# Patient Record
Sex: FEMALE | NOT HISPANIC OR LATINO
[De-identification: names, ages, dates, MRNs, and addresses within clinical notes are randomized per-mention and may not be internally consistent; named-entity substitution may affect disease eponyms.]

---
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
## 2024-04-25 ENCOUNTER — OFFICE VISIT (OUTPATIENT)
Dept: FAMILY MEDICINE CLINIC | Facility: CLINIC | Age: 19
End: 2024-04-25
Payer: COMMERCIAL

## 2024-04-25 VITALS
TEMPERATURE: 98 F | RESPIRATION RATE: 18 BRPM | WEIGHT: 126 LBS | HEART RATE: 78 BPM | OXYGEN SATURATION: 100 % | DIASTOLIC BLOOD PRESSURE: 64 MMHG | HEIGHT: 66.93 IN | SYSTOLIC BLOOD PRESSURE: 104 MMHG | BODY MASS INDEX: 19.78 KG/M2

## 2024-04-25 DIAGNOSIS — K13.70 ORAL LESION: Primary | ICD-10-CM

## 2024-04-25 DIAGNOSIS — J02.9 SORE THROAT: ICD-10-CM

## 2024-04-25 LAB
CONTROL LINE PRESENT WITH A CLEAR BACKGROUND (YES/NO): YES YES/NO
KIT LOT #: NORMAL NUMERIC
STREP GRP A CUL-SCR: NEGATIVE

## 2024-04-25 PROCEDURE — 99212 OFFICE O/P EST SF 10 MIN: CPT | Performed by: PHYSICIAN ASSISTANT

## 2024-04-25 PROCEDURE — 3078F DIAST BP <80 MM HG: CPT | Performed by: PHYSICIAN ASSISTANT

## 2024-04-25 PROCEDURE — 87880 STREP A ASSAY W/OPTIC: CPT | Performed by: PHYSICIAN ASSISTANT

## 2024-04-25 PROCEDURE — 3008F BODY MASS INDEX DOCD: CPT | Performed by: PHYSICIAN ASSISTANT

## 2024-04-25 PROCEDURE — 3074F SYST BP LT 130 MM HG: CPT | Performed by: PHYSICIAN ASSISTANT

## 2024-04-26 NOTE — PROGRESS NOTES
CHIEF COMPLAINT:     Chief Complaint   Patient presents with    Other     2 days ago bump appeared on right cheek near the back, sore throat         HPI:   Basia Patino is a 18 year old female who presents with mostly right sided sore throat and noted a painful area in the right cheek area.  Denies any trauma. She can feel a bump with her tongue and it is painful.       Current Outpatient Medications   Medication Sig Dispense Refill    norethindrone-ethinyl estradiol 0.5-35 MG-MCG Oral Tab Take 1 tablet by mouth daily.        History reviewed. No pertinent past medical history.   Social History:  Social History     Socioeconomic History    Marital status: Single   Tobacco Use    Smoking status: Never     Passive exposure: Never    Smokeless tobacco: Never   Vaping Use    Vaping status: Every Day   Substance and Sexual Activity    Alcohol use: No    Drug use: No     Social Determinants of Health     Food Insecurity: No Food Insecurity (12/8/2023)    Received from [facility]    Food Insecurity     Currently or in the past 3 months, have you worried your food would run out before you had money to buy more?: No     In the past 12 months, have you run out of food or been unable to get more?: No   Transportation Needs: No Transportation Needs (12/8/2023)    Received from [facility]    Transportation Needs     Medical Transportation Needs?: No    Received from [facility], [facility]    Social Connections    Received from [facility], [facility]    Housing Stability        REVIEW OF SYSTEMS:   GENERAL: Denies fever, chills,weight change, decreased appetite  SKIN: Denies rashes, skin wounds or ulcers.  EYES: Denies blurred vision or double vision  HENT: Denies congestion, rhinorrhea, sore throat or ear pain  CHEST: Denies chest pain, or palpitations  LUNGS: Denies shortness of breath, cough, or  wheezing  GI: Denies abdominal pain, N/V/C/D.   MUSCULOSKELETAL: no arthralgia or swollen joints  LYMPH:  Denies lymphadenopathy  NEURO: Denies headaches or lightheadedness    Positive for stated complaint: sore throat and oral lesion.   Pertinent positives and negatives noted in the the HPI.    EXAM:   /64   Pulse 78   Temp 97.7 °F (36.5 °C)   Resp 18   Ht 5' 6.93\" (1.7 m)   Wt 126 lb (57.2 kg)   LMP 04/04/2024 (Approximate)   SpO2 100%   BMI 19.78 kg/m²   GENERAL: well developed, well nourished,in no apparent distress.  Clear voice  SKIN: no rashes,no suspicious lesions  HEAD: atraumatic, normocephalic  EYES: conjunctiva clear, sclera white,  PERRLA  EARS: TM's non erythematous  NOSE: nares patent, mucosa without congestion  THROAT: Posterior pharynx is mildy erythematous, no PND, no exudates, tonsils 1 + without exudate.  She has a 5-6 mm fleshy papule on the right posterior buccal mucosa.  It is is mildy erythematous, no ulceration or drainage.   NECK: supple, non-tender  LUNGS: clear to auscultation bilaterally without rale, ronchi, wheeze.  CARDIO: S1/S2 without murmur  GI: BS's present x4. No palpable masses or organomegaly.  no tenderness on palpation  EXTREMITIES: no cyanosis, clubbing or edema  LYMPH:  no  lymphadenopathy.      Recent Results (from the past 24 hour(s))   Strep A Assay W/Optic    Collection Time: 04/25/24  7:41 PM   Result Value Ref Range    Strep Grp A Screen negative Negative    Control Line Present with a clear background (yes/no) yes Yes/No    Kit Lot # [id number] Numeric    Kit Expiration Date 3/1/25 Date         ASSESSMENT AND PLAN:   Basia Patino is a 18 year old female who presents with Other (2 days ago bump appeared on right cheek near the back, sore throat/). Symptoms are consistent with:      ASSESSMENT:  Encounter Diagnoses   Name Primary?    Sore throat     Oral lesion Yes         PLAN:  RSS is negative.    The lesion has a benign  appearance and for now  advise chloraseptic spray, tylenol, gargles.  If persistent over the next week or two follow up with ENT or oral surgery for evaluation.      Meds as below.  Comfort care instructions as listed in Patient Instructions    Meds & Refills for this Visit:  Requested Prescriptions      No prescriptions requested or ordered in this encounter       Risks, benefits, side effects of medication addressed and explained.    There are no Patient Instructions on file for this visit.    The patient indicates understanding of these issues and agrees to the plan.  The patient is asked to follow up PCP if sx's persist or worsen.

## 2024-05-09 ENCOUNTER — HOSPITAL ENCOUNTER (OUTPATIENT)
Age: 19
Discharge: HOME | End: 2024-05-09
Payer: COMMERCIAL

## 2024-05-09 VITALS
HEIGHT: 66 IN | TEMPERATURE: 98 F | OXYGEN SATURATION: 99 % | WEIGHT: 120 LBS | BODY MASS INDEX: 19.29 KG/M2 | SYSTOLIC BLOOD PRESSURE: 102 MMHG | DIASTOLIC BLOOD PRESSURE: 65 MMHG | RESPIRATION RATE: 16 BRPM | HEART RATE: 105 BPM

## 2024-05-09 DIAGNOSIS — J06.9 VIRAL URI: Primary | ICD-10-CM

## 2024-05-09 DIAGNOSIS — J02.9 ACUTE VIRAL PHARYNGITIS: ICD-10-CM

## 2024-05-09 LAB
S PYO AG THROAT QL: NEGATIVE
SARS-COV-2 RNA RESP QL NAA+PROBE: NOT DETECTED

## 2024-05-09 PROCEDURE — U0002 COVID-19 LAB TEST NON-CDC: HCPCS | Performed by: NURSE PRACTITIONER

## 2024-05-09 PROCEDURE — 87880 STREP A ASSAY W/OPTIC: CPT | Performed by: NURSE PRACTITIONER

## 2024-05-09 PROCEDURE — 99213 OFFICE O/P EST LOW 20 MIN: CPT | Performed by: NURSE PRACTITIONER

## 2024-05-09 NOTE — ED PROVIDER NOTES
Patient Seen in: [facility]      History     Chief Complaint   Patient presents with    Sore Throat    Dizziness    Stuffy Nose     Stated Complaint: sore throat, dizzy    Subjective:   18-year-old female presents today with complaints of mild URI symptoms with worsening sore throat and feelings of dizziness.  Symptoms over the last 1-2 days.  Denies recent exposure to illness.  No difficulty swallowing controlling screws.  No stridor shortness of breath.  Patient is afebrile on arrival.  No other symptoms or concerns.  The patient's medication list, past medical history and social history elements as listed in today's nurse's notes were reviewed and agreed (except as otherwise stated in the HPI).  The patient's family history reviewed and determined to be noncontributory to the presenting problem            Objective:   History reviewed. No pertinent past medical history.           History reviewed. No pertinent surgical history.             Social History     Socioeconomic History    Marital status: Single   Tobacco Use    Smoking status: Never     Passive exposure: Never    Smokeless tobacco: Never   Vaping Use    Vaping status: Every Day   Substance and Sexual Activity    Alcohol use: No    Drug use: No     Social Determinants of Health     Food Insecurity: No Food Insecurity (12/8/2023)    Received from [facility]    Food Insecurity     Currently or in the past 3 months, have you worried your food would run out before you had money to buy more?: No     In the past 12 months, have you run out of food or been unable to get more?: No   Transportation Needs: No Transportation Needs (12/8/2023)    Received from [facility]    Transportation Needs     Medical Transportation Needs?: No    Received from [facility], [facility]    Social Connections    Received from [facility], [facility]     Housing Stability              Review of Systems    Positive for stated complaint: sore throat, dizzy  Other systems are as noted in HPI.  Constitutional and vital signs reviewed.      All other systems reviewed and negative except as noted above.    Physical Exam     ED Triage Vitals [05/09/24 1427]   /65   Pulse 105   Resp 16   Temp 98.1 °F (36.7 °C)   Temp src Temporal   SpO2 99 %   O2 Device None (Room air)       Current Vitals:   Vital Signs  BP: 102/65  Pulse: 105  Resp: 16  Temp: 98.1 °F (36.7 °C)  Temp src: Temporal    Oxygen Therapy  SpO2: 99 %  O2 Device: None (Room air)            Physical Exam  Vitals and nursing note reviewed.   Constitutional:       Appearance: She is well-developed.   HENT:      Head: Normocephalic.      Right Ear: Tympanic membrane and ear canal normal.      Left Ear: Tympanic membrane and ear canal normal.      Nose: Mucosal edema and congestion present.      Mouth/Throat:      Pharynx: Uvula midline. Pharyngeal swelling and posterior oropharyngeal erythema present.   Eyes:      Conjunctiva/sclera: Conjunctivae normal.      Pupils: Pupils are equal, round, and reactive to light.   Cardiovascular:      Rate and Rhythm: Normal rate and regular rhythm.   Pulmonary:      Effort: Pulmonary effort is normal.      Breath sounds: Normal breath sounds.   Musculoskeletal:      Cervical back: Normal range of motion and neck supple.   Lymphadenopathy:      Cervical: No cervical adenopathy.   Skin:     General: Skin is warm and dry.   Neurological:      Mental Status: She is alert and oriented to person, place, and time.               ED Course     Labs Reviewed   POCT RAPID STREP - Normal   RAPID SARS-COV-2 BY PCR - Normal                      MDM     Please note that this report has been produced using speech recognition software and may contain errors related to that system including, but not limited to, errors in grammar, punctuation, and spelling, as well as words and phrases that  possibly may have been recognized inappropriately.  If there are any questions or concerns, contact the dictating provider for clarification.        Note to patient: The 21st Century Cures Act makes medical notes like these available to patients in the interest of transparency. However, this is a medical document intended as peer to peer communication. It is written in medical language and may contain abbreviations or verbiage that are unfamiliar. It may appear blunt or direct. Medical documents are intended to carry relevant information, facts as evident, and the clinical opinion of the practitioner.                                   Medical Decision Making  Differential diagnosis includes but is not limited to: COVID-19, viral URI, strep throat, influenza, pneumonia, sinusitis, bronchitis      Presented today with URI symptoms with worsening sore throat.  Symptoms started yesterday.  Worsening of sore throat today.  COVID-19 testing was done and negative.  Rapid strep also done and negative.  Suspect viral cause of illness.  Encouraged to Push fluids and rest, alternate Tylenol and Motrin for any fever pain.  Take over-the-counter antihistamine and cough suppressant as needed.  Use a cool-mist humidifier at the bedside.  To follow primary care physician in 1 week if symptoms do not improve.  Take over-the-counter zinc and vitamin C to boost your immune system.  Patient verbalized understanding agree with plan of care.        Amount and/or Complexity of Data Reviewed  Labs: ordered. Decision-making details documented in ED Course.     Details: Rapid strep  Rapid COVID-19    Risk  OTC drugs.        Disposition and Plan     Clinical Impression:  1. Viral URI    2. Acute viral pharyngitis         Disposition:  Discharge  5/9/2024  3:11 pm    Follow-up:  Kenny He MD  [address] [place] [address]  [address] [place] [id number]  [phone]    In 1 week            Medications Prescribed:  Current Discharge Medication List

## 2024-05-09 NOTE — DISCHARGE INSTRUCTIONS
Take over-the-counter antihistamine such as Zyrtec Allegra Claritin or Xyzal.  May also use Flonase nasal spray.  This will help with inflammation of the sinus throat and ears.  Alternate Tylenol and Motrin for any fever pain.  Be sure to push fluids and rest.  Follow-up with your primary care physician in 1 week if symptoms do not improve.

## 2024-11-14 ENCOUNTER — HOSPITAL ENCOUNTER (OUTPATIENT)
Age: 19
Discharge: HOME | End: 2024-11-14
Payer: COMMERCIAL

## 2024-11-14 VITALS
HEART RATE: 117 BPM | DIASTOLIC BLOOD PRESSURE: 84 MMHG | OXYGEN SATURATION: 99 % | TEMPERATURE: 98 F | RESPIRATION RATE: 18 BRPM | SYSTOLIC BLOOD PRESSURE: 117 MMHG

## 2024-11-14 DIAGNOSIS — Z20.2 EXPOSURE TO CHLAMYDIA: Primary | ICD-10-CM

## 2024-11-14 DIAGNOSIS — R30.0 DYSURIA: ICD-10-CM

## 2024-11-14 LAB
B-HCG UR QL: NEGATIVE
BILIRUB UR QL STRIP: NEGATIVE
CLARITY UR: CLEAR
COLOR UR: YELLOW
GLUCOSE UR STRIP-MCNC: NEGATIVE MG/DL
HGB UR QL STRIP: NEGATIVE
KETONES UR STRIP-MCNC: NEGATIVE MG/DL
NITRITE UR QL STRIP: NEGATIVE
PH UR STRIP: 7 [PH]
PROT UR STRIP-MCNC: 30 MG/DL
SP GR UR STRIP: 1.02
UROBILINOGEN UR STRIP-ACNC: <2 MG/DL

## 2024-11-14 PROCEDURE — 87086 URINE CULTURE/COLONY COUNT: CPT | Performed by: PHYSICIAN ASSISTANT

## 2024-11-14 PROCEDURE — 87591 N.GONORRHOEAE DNA AMP PROB: CPT | Performed by: PHYSICIAN ASSISTANT

## 2024-11-14 PROCEDURE — 99214 OFFICE O/P EST MOD 30 MIN: CPT | Performed by: PHYSICIAN ASSISTANT

## 2024-11-14 PROCEDURE — 81025 URINE PREGNANCY TEST: CPT | Performed by: PHYSICIAN ASSISTANT

## 2024-11-14 PROCEDURE — 96372 THER/PROPH/DIAG INJ SC/IM: CPT | Performed by: PHYSICIAN ASSISTANT

## 2024-11-14 PROCEDURE — 81514 NFCT DS BV&VAGINITIS DNA ALG: CPT | Performed by: PHYSICIAN ASSISTANT

## 2024-11-14 PROCEDURE — 87491 CHLMYD TRACH DNA AMP PROBE: CPT | Performed by: PHYSICIAN ASSISTANT

## 2024-11-14 PROCEDURE — 81002 URINALYSIS NONAUTO W/O SCOPE: CPT | Performed by: PHYSICIAN ASSISTANT

## 2024-11-14 RX ORDER — DOXYCYCLINE 100 MG/1
100 CAPSULE ORAL 2 TIMES DAILY
Qty: 14 CAPSULE | Refills: 0 | Status: SHIPPED | OUTPATIENT
Start: 2024-11-14 | End: 2024-11-21

## 2024-11-14 NOTE — ED PROVIDER NOTES
Patient Seen in: [facility]      History     Chief Complaint   Patient presents with    Eval-G     Stated Complaint: STD check    Subjective:   HPI      Patient states she found out a week ago that her boyfriend tested positive for chlamydia.  Yesterday she started having dysuria and some watery vaginal discharge.  Denies abdominal/pelvic pain.  Denies fevers, chills, vomiting, diarrhea.  Denies prior STIs.  Denies any other complaints/concerns at this time.     Objective:     History reviewed. No pertinent past medical history.           History reviewed. No pertinent surgical history.             No pertinent social history.            Review of Systems    Positive for stated complaint: STD check  Other systems are as noted in HPI.  Constitutional and vital signs reviewed.      All other systems reviewed and negative except as noted above.    Physical Exam     ED Triage Vitals [11/14/24 1354]   /84   Pulse 117   Resp 18   Temp 98 °F (36.7 °C)   Temp src Temporal   SpO2 99 %   O2 Device None (Room air)       Current Vitals:   Vital Signs  BP: 117/84  Pulse: 117  Resp: 18  Temp: 98 °F (36.7 °C)  Temp src: Temporal    Oxygen Therapy  SpO2: 99 %  O2 Device: None (Room air)        Physical Exam  Vitals and nursing note reviewed. Exam conducted with a chaperone present (Patricia technician).   Constitutional:       Appearance: Normal appearance.   Eyes:      Conjunctiva/sclera: Conjunctivae normal.   Pulmonary:      Effort: Pulmonary effort is normal.   Genitourinary:     Vagina: No foreign body. Vaginal discharge (yellowish) present. No erythema or bleeding.      Uterus: Normal.       Adnexa: Right adnexa normal and left adnexa normal.      Comments: No CMT  Musculoskeletal:         General: Normal range of motion.   Skin:     General: Skin is warm and dry.   Neurological:      General: No focal deficit present.      Mental Status: She is alert.   Psychiatric:         Mood and Affect: Mood normal.              ED Course     Labs Reviewed   [facility] POCT URINALYSIS DIPSTICK - Abnormal; Notable for the following components:       Result Value    Protein urine 30 (*)     Leukocyte esterase urine Small (*)     All other components within normal limits   POCT PREGNANCY URINE - Normal   CHLAMYDIA/GONOCOCCUS, ABDIRAHMAN   URINE CULTURE, ROUTINE   VAGINITIS VAGINOSIS PCR PANEL                   MDM      Differential diagnosis includes but is not limited to STI exposure, chlamydia, gonorrhea, PID, UTI.    Patient well-appearing, non-toxic.  Given her exposure plan to treat with antibiotics at this time, cultures pending.  I advised no intercourse until completion of antibiotics.  I advised followup with her PCP/Gyne for further outpatient testing, including HIV and syphilis.  I advised supportive care at home, follow up and provided return precautions.  Patient verbalized understanding/agreement of plan.         Medical Decision Making      Disposition and Plan     Clinical Impression:  1. Exposure to chlamydia    2. Dysuria         Disposition:  Discharge  11/14/2024  2:37 pm    Follow-up:  Kenny He MD  [address] [place] [address]  [address] [place] [address]  [phone]    In 3 days            Medications Prescribed:  Current Discharge Medication List        START taking these medications    Details   doxycycline 100 MG Oral Cap Take 1 capsule (100 mg total) by mouth 2 (two) times daily for 7 days.  Qty: 14 capsule, Refills: 0                 Supplementary Documentation:

## 2024-11-14 NOTE — ED INITIAL ASSESSMENT (HPI)
Pt sts boyfriend dx'd  with chlamydia last week. Pt sts yesterday began with vaginal burning, small amt of blood on tissue when wiping, non odorous yellow, thin discharge.

## 2024-11-14 NOTE — DISCHARGE INSTRUCTIONS
Take antibiotics as instructed.  We will notify you of pending test results.  Follow up with your PCP for further outpatient STD testing, including HIV and syphilis.  Go to ER for new/worsening symptoms (ie abdominal pain, fevers, vomiting, etc)    While taking antibiotics it is recommended that you also take an over the counter probiotics.  If you'd like, you can have 2 servings of yogurt/Kefir daily instead.  Probiotics increase the good bacteria in your gut while the antibiotic fights the bad bacteria that is causing your infection.  The good bacteria in your gut act as part of your immune system.  Taking a probiotic while on antibiotics will decrease the chances of upset stomach and diarrhea, which are common side effects of antibiotics.

## 2024-11-15 LAB
BV BACTERIA DNA VAG QL NAA+PROBE: NEGATIVE
C GLABRATA DNA VAG QL NAA+PROBE: NEGATIVE
C KRUSEI DNA VAG QL NAA+PROBE: NEGATIVE
C TRACH DNA SPEC QL NAA+PROBE: POSITIVE
CANDIDA DNA VAG QL NAA+PROBE: NEGATIVE
N GONORRHOEA DNA SPEC QL NAA+PROBE: NEGATIVE
T VAGINALIS DNA VAG QL NAA+PROBE: NEGATIVE

## 2024-11-17 ENCOUNTER — HOSPITAL ENCOUNTER (OUTPATIENT)
Age: 19
Discharge: HOME | End: 2024-11-17
Payer: COMMERCIAL

## 2024-11-17 VITALS
SYSTOLIC BLOOD PRESSURE: 96 MMHG | HEIGHT: 66 IN | DIASTOLIC BLOOD PRESSURE: 63 MMHG | BODY MASS INDEX: 19.29 KG/M2 | TEMPERATURE: 99 F | WEIGHT: 120 LBS | RESPIRATION RATE: 16 BRPM | HEART RATE: 108 BPM | OXYGEN SATURATION: 100 %

## 2024-11-17 DIAGNOSIS — N89.8 VAGINAL SORE: Primary | ICD-10-CM

## 2024-11-17 PROCEDURE — 87529 HSV DNA AMP PROBE: CPT | Performed by: NURSE PRACTITIONER

## 2024-11-17 RX ORDER — VALACYCLOVIR HYDROCHLORIDE 1 G/1
1000 TABLET, FILM COATED ORAL 3 TIMES DAILY
Qty: 21 TABLET | Refills: 0 | Status: SHIPPED | OUTPATIENT
Start: 2024-11-17 | End: 2024-11-24

## 2024-11-17 NOTE — ED INITIAL ASSESSMENT (HPI)
Patient here with c/o vaginal irritation/bumps since this morning. Patient is currently being treated for Chlamydia.

## 2024-11-17 NOTE — ED PROVIDER NOTES
Patient Seen in: [facility]      History     Chief Complaint   Patient presents with    Eval-G     Stated Complaint: female issue    Subjective:   HPI  19-year-old female presents to me care with complaints of vaginal lesions to the outer labia area.  Patient is being currently treated for chlamydia patient was seen on Friday.  Patient states she has never had any lesions before.  Does not shave the area.  Patient has no other concerns.  The patient's medication list, past medical history and social history elements as listed in today's nurse's notes were reviewed and agreed (except as otherwise stated in the HPI).  The patient's family history reviewed and determined to be noncontributory to the presenting problem.      Objective:     History reviewed. No pertinent past medical history.           History reviewed. No pertinent surgical history.             No pertinent social history.            Review of Systems    Positive for stated complaint: female issue  Other systems are as noted in HPI.  Constitutional and vital signs reviewed.      All other systems reviewed and negative except as noted above.    Physical Exam     ED Triage Vitals [11/17/24 1332]   BP 96/63   Pulse 108   Resp 16   Temp 98.6 °F (37 °C)   Temp src Temporal   SpO2 100 %   O2 Device None (Room air)       Current Vitals:   Vital Signs  BP: 96/63  Pulse: 108  Resp: 16  Temp: 98.6 °F (37 °C)  Temp src: Temporal    Oxygen Therapy  SpO2: 100 %  O2 Device: None (Room air)        Physical Exam  Vitals and nursing note reviewed. Exam conducted with a chaperone present.   Constitutional:       Appearance: Normal appearance.   Cardiovascular:      Rate and Rhythm: Normal rate.   Pulmonary:      Effort: Pulmonary effort is normal.   Genitourinary:     Exam position: Lithotomy position.      Labia:         Right: Lesion present.         Left: Lesion present.       Vagina: Vaginal discharge present. No lesions.      Cervix: Discharge present.       Comments: Multiple lesions noted to the labia and to the top of the clitoris area lesions are circular in nature tender on palpation.  Skin:     General: Skin is warm and dry.      Capillary Refill: Capillary refill takes less than 2 seconds.   Neurological:      General: No focal deficit present.      Mental Status: She is alert and oriented to person, place, and time.             ED Course     Labs Reviewed   HSV 1/2 SUBTYPE BY PCR (LESION-ONLY)                   MDM   Pertinent Labs & Imaging studies reviewed. (See chart for details)  Differential diagnosis considered but not limited to: HSV 1 HSV-2 syphilis vaginal lesions  Patient coming in with vaginal sores/lesions. Patient provided with pain medication. Labs reviewed HSV swab pending.  . Will treat for possible HSV. Will discharge on Valtrex. Patient is comfortable with this plan.  Overall Pt looks good. Non-toxic, well-hydrated and in no respiratory distress. Vital signs are reassuring. Exam is reassuring. I do not believe pt  requires and additional  diagnostic studiesor intervention. I believe pt  can be discharged home to continue evaluation as an outpatient. Follow-up provider given. Discharge instructions given and reviewed. Return for any problems. All understand and agreewith the plan.    Please note that this report has been produced using speech recognition software and may contain errors related to that system including, but not limited to, errors in grammar, punctuation, and spelling, as well as words and phrases that possibly may have been recognized inappropriately.  If there are any questions or concerns, contact the dictating provider for clarification.    Note to patient: The 21st Century Cures Act makes medical notes like these available to patients in the interest of transparency. However, this is a medical document intended as peer to peer communication. It is written in medical language and may contain abbreviations or verbiage that  are unfamiliar. It may appear blunt or direct. Medical documents are intended to carry relevant information, facts as evident, and the clinical opinion of the practitioner.           Medical Decision Making      Disposition and Plan     Clinical Impression:  1. Vaginal sore         Disposition:  Discharge  11/17/2024  2:11 pm    Follow-up:  Kenny He MD  [address] [facility]  [address]  [facility] [address]  [phone]                Medications Prescribed:  Discharge Medication List as of 11/17/2024  2:11 PM        START taking these medications    Details   valACYclovir 1 G Oral Tab Take 1 tablet (1,000 mg total) by mouth 3 (three) times daily for 7 days., Normal, Disp-21 tablet, R-0                 Supplementary Documentation:

## 2024-11-17 NOTE — DISCHARGE INSTRUCTIONS
Follow-up with your primary care physician in one week if symptoms have not improved or symptoms are starting to get worse.  Increase fluids, keep well-hydrated.  Take Tylenol and Motrin for fever and pain.  Take the Valtrex 3 times daily for 7 days  Continue all your other medicines

## 2024-11-18 RX ORDER — ONDANSETRON 4 MG/1
4 TABLET, ORALLY DISINTEGRATING ORAL EVERY 4 HOURS PRN
Qty: 10 TABLET | Refills: 0 | Status: SHIPPED | OUTPATIENT
Start: 2024-11-18 | End: 2024-11-25

## 2024-11-20 LAB
HSV 1 NAA: POSITIVE
HSV 1 NAA: POSITIVE
HSV 2 NAA: NEGATIVE
HSV 2 NAA: NEGATIVE

## 2025-04-29 ENCOUNTER — OFFICE VISIT (OUTPATIENT)
Dept: FAMILY MEDICINE CLINIC | Facility: CLINIC | Age: 20
End: 2025-04-29
Payer: COMMERCIAL

## 2025-04-29 VITALS
SYSTOLIC BLOOD PRESSURE: 96 MMHG | BODY MASS INDEX: 19.77 KG/M2 | TEMPERATURE: 98 F | WEIGHT: 123 LBS | RESPIRATION RATE: 18 BRPM | HEIGHT: 66 IN | OXYGEN SATURATION: 100 % | HEART RATE: 102 BPM | DIASTOLIC BLOOD PRESSURE: 58 MMHG

## 2025-04-29 DIAGNOSIS — Z20.818 EXPOSURE TO STREP THROAT: ICD-10-CM

## 2025-04-29 DIAGNOSIS — J02.9 SORE THROAT: Primary | ICD-10-CM

## 2025-04-29 PROCEDURE — 87880 STREP A ASSAY W/OPTIC: CPT | Performed by: NURSE PRACTITIONER

## 2025-04-29 PROCEDURE — 99213 OFFICE O/P EST LOW 20 MIN: CPT | Performed by: NURSE PRACTITIONER

## 2025-04-29 PROCEDURE — 3078F DIAST BP <80 MM HG: CPT | Performed by: NURSE PRACTITIONER

## 2025-04-29 PROCEDURE — 87081 CULTURE SCREEN ONLY: CPT | Performed by: NURSE PRACTITIONER

## 2025-04-29 PROCEDURE — 3074F SYST BP LT 130 MM HG: CPT | Performed by: NURSE PRACTITIONER

## 2025-04-29 PROCEDURE — 3008F BODY MASS INDEX DOCD: CPT | Performed by: NURSE PRACTITIONER

## 2025-04-29 RX ORDER — VALACYCLOVIR HYDROCHLORIDE 500 MG/1
500 TABLET, FILM COATED ORAL
COMMUNITY
Start: 2024-12-13

## 2025-04-29 RX ORDER — NORGESTIMATE AND ETHINYL ESTRADIOL 7DAYSX3 28
1 KIT ORAL DAILY
COMMUNITY
Start: 2024-12-13

## 2025-04-29 NOTE — PROGRESS NOTES
Subjective:   Patient ID: Basia Patino is a 19 year old female.    Patient is a 19 year old female who presents today with complaints of sore throat and feeling warm x last night. Denies runny nose, nasal congestion, cough, ear pain, n/v/d, abdominal pain, fever, body aches, chills, rashes or headaches. Tolerating PO well at home. Attempted treatment prior to arrival = Bendaryl. + strep exposure at work.        History/Other:   Review of Systems   Constitutional:  Negative for appetite change, chills and fever.   HENT:  Positive for sore throat. Negative for congestion, ear pain and rhinorrhea.    Respiratory:  Negative for cough.    Gastrointestinal:  Negative for abdominal pain, diarrhea, nausea and vomiting.   Musculoskeletal:  Negative for myalgias.   Skin:  Negative for rash.   Neurological:  Negative for headaches.     Current Medications[1]  Allergies:Allergies[2]    BP 96/58   Pulse 102   Temp 97.6 °F (36.4 °C)   Resp 18   Ht 5' 6\" (1.676 m)   Wt 123 lb (55.8 kg)   LMP 03/25/2025 (Approximate)   SpO2 100%   BMI 19.85 kg/m²       Objective:   Physical Exam  Vitals reviewed.   Constitutional:       General: She is awake. She is not in acute distress.     Appearance: Normal appearance. She is well-developed and well-groomed. She is not ill-appearing, toxic-appearing or diaphoretic.   HENT:      Head: Normocephalic and atraumatic.      Right Ear: Tympanic membrane, ear canal and external ear normal.      Left Ear: Tympanic membrane, ear canal and external ear normal.      Nose: Nose normal. No congestion or rhinorrhea.      Mouth/Throat:      Lips: Pink.      Mouth: Mucous membranes are moist. No oral lesions.      Pharynx: Oropharynx is clear. Uvula midline. Posterior oropharyngeal erythema present. No postnasal drip.      Tonsils: No tonsillar exudate. 2+ on the right. 2+ on the left.   Cardiovascular:      Rate and Rhythm: Normal rate and regular rhythm.   Pulmonary:      Effort: Pulmonary  effort is normal. No respiratory distress.      Breath sounds: Normal breath sounds and air entry. No decreased breath sounds, wheezing, rhonchi or rales.   Lymphadenopathy:      Head:      Right side of head: No tonsillar adenopathy.      Left side of head: No tonsillar adenopathy.      Cervical: No cervical adenopathy.   Skin:     General: Skin is warm and dry.   Neurological:      Mental Status: She is alert and oriented to person, place, and time.   Psychiatric:         Behavior: Behavior is cooperative.         Assessment & Plan:   1. Sore throat    2. Exposure to strep throat        Orders Placed This Encounter   Procedures    Strep A Assay W/Optic    Grp A Strep Cult, Throat     Results for orders placed or performed in visit on 04/29/25   Strep A Assay W/Optic    Collection Time: 04/29/25 11:50 AM   Result Value Ref Range    Strep Grp A Screen negative Negative    Control Line Present with a clear background (yes/no) yes Yes/No    Kit Lot # [id number] Numeric    Kit Expiration Date 06/03/2026 Date       Meds This Visit:  Requested Prescriptions      No prescriptions requested or ordered in this encounter     Reviewed POC test results with patient.  Throat cx collected and sent - will notify of results.  Reassuring physical exam findings. Vitals WNL. No sign of bacterial etiology, RDS or dehydration at this time.  Supportive care and return to care measures reviewed.  Patient v/u and is comfortable with this plan.    Patient Instructions   1. Gargle throat with 1 tsp (5 g) of salt dissolved in 8 fl oz (240 mL) of warm water.  You may also drink warm broth/soup, or tea with honey  2. Drink plenty of fluids and get plenty of rest.   3. You may use throat lozenges, acetaminophen, or ibuprofen for pain and fever.    4. Using an OTC antihistamine such as once daily Zyrtec or Claritin (choose one) may help to dry any postnasal drip and decrease irritation to your throat. Take as directed.  5. Follow up with primary  care physician in 1-2 weeks or sooner if needed.  6. Seek medical attention sooner for worsening of symptoms despite treatment efforts, or the emergency room for the following non inclusive list of symptoms: uncontrolled fever/pain, inability to keep fluids down, shortness of breath or respiratory distress.  7.  We will notify you of throat culture results in the next few days             [1]   Current Outpatient Medications   Medication Sig Dispense Refill    valACYclovir 500 MG Oral Tab Take 1 tablet (500 mg total) by mouth.      Norgestim-Eth Estrad Triphasic 0.18/0.215/0.25 MG-35 MCG Oral Tab Take 1 tablet by mouth daily.     [2]   Allergies  Allergen Reactions    Sulfa Antibiotics RASH    Sulfamethoxazole W/Trimethoprim RASH

## 2025-08-25 ENCOUNTER — TELEPHONE (OUTPATIENT)
Facility: LOCATION | Age: 20
End: 2025-08-25

## 2025-08-27 ENCOUNTER — HOSPITAL ENCOUNTER (OUTPATIENT)
Age: 20
Discharge: HOME | End: 2025-08-27

## 2025-08-27 VITALS
RESPIRATION RATE: 18 BRPM | TEMPERATURE: 99 F | SYSTOLIC BLOOD PRESSURE: 101 MMHG | HEART RATE: 91 BPM | OXYGEN SATURATION: 99 % | DIASTOLIC BLOOD PRESSURE: 62 MMHG

## 2025-08-27 DIAGNOSIS — J02.9 VIRAL PHARYNGITIS: Primary | ICD-10-CM

## 2025-08-27 LAB — S PYO AG THROAT QL: NEGATIVE

## 2025-08-27 PROCEDURE — 99213 OFFICE O/P EST LOW 20 MIN: CPT | Performed by: NURSE PRACTITIONER

## 2025-08-27 PROCEDURE — 87880 STREP A ASSAY W/OPTIC: CPT | Performed by: NURSE PRACTITIONER

## (undated) NOTE — LETTER
Date & Time: 12/5/2023, 6:13 PM  Patient: Hortensia Akhtar  Encounter Provider(s):    KAREN Patel       To Whom It May Concern:    Alejandra Barton was seen and treated in our department on 12/5/2023. She should not return to work until 12/08/23 .     If you have any questions or concerns, please do not hesitate to call.        _____________________________  Physician/[facility] Signature

## (undated) NOTE — LETTER
Date & Time: 7/3/2023, 11:38 AM  Patient: Christiano Pham  Encounter Provider(s):    MORRIS Lewis       To Whom It May Concern:    Soco Dowd was seen and treated in our department on 7/3/2023. She can return to work with no restrictions.     If you have any questions or concerns, please do not hesitate to call.        _____________________________  Physician/[facility] Signature

## (undated) NOTE — LETTER
Date & Time: 12/5/2023, 6:13 PM  Patient: Chano Hartman  Encounter Provider(s):    KAREN Mendez       To Whom It May Concern:    Cristy Manzo was seen and treated in our department on 12/5/2023. She should not return to work until 12/07/23 .     If you have any questions or concerns, please do not hesitate to call.        _____________________________  Physician/[facility] Signature